# Patient Record
Sex: FEMALE | Race: WHITE | NOT HISPANIC OR LATINO | URBAN - METROPOLITAN AREA
[De-identification: names, ages, dates, MRNs, and addresses within clinical notes are randomized per-mention and may not be internally consistent; named-entity substitution may affect disease eponyms.]

---

## 2019-09-23 ENCOUNTER — OUTPATIENT (OUTPATIENT)
Dept: OUTPATIENT SERVICES | Facility: HOSPITAL | Age: 74
LOS: 1 days | Discharge: HOME | End: 2019-09-23
Payer: MEDICARE

## 2019-09-23 VITALS — HEIGHT: 60 IN | WEIGHT: 177.91 LBS

## 2019-09-23 DIAGNOSIS — Z84.2 FAMILY HISTORY OF OTHER DISEASES OF THE GENITOURINARY SYSTEM: Chronic | ICD-10-CM

## 2019-09-23 PROCEDURE — 93458 L HRT ARTERY/VENTRICLE ANGIO: CPT | Mod: 26

## 2019-09-23 NOTE — H&P CARDIOLOGY - HISTORY OF PRESENT ILLNESS
HPI  74 yrs old female with PMHx of HTN and FHx of CAD presented for [facility] due to abnormal ETT. She is asymptomatic now.  REVIEW OF SYSTEMS:  CONSTITUTIONAL: No weakness, fevers or chills  EYES/ENT: No visual changes;  No vertigo or throat pain   NECK: No pain or stiffness  RESPIRATORY: No cough, wheezing, hemoptysis; SEE HPI  CARDIOVASCULAR: SEE HPI  GASTROINTESTINAL: No abdominal or epigastric pain. No nausea, vomiting, or hematemesis; No diarrhea or constipation. No melena or hematochezia.  GENITOURINARY: No dysuria, frequency or hematuria  NEUROLOGICAL: No numbness or weakness  SKIN: No itching, rashes      PHYSICAL EXAM:  T(C): --  HR: 80  BP: 188/75  RR: 16  SpO2: 97% ra  GENERAL: NAD  HEAD:  Atraumatic, Normocephalic  EYES: conjunctiva and sclera clear  NECK: No JVD  CHEST/LUNG: Clear to auscultation bilaterally; No wheeze  HEART: Regular rate and rhythm; No murmurs  ABDOMEN: Soft, Nontender, Nondistended; Bowel sounds present  EXTREMITIES:  2+ Peripheral Pulses, No clubbing, cyanosis, or edema  NEUROLOGY:  A&Ox3, appropriate  SKIN: No rashes or lesions  ALAINA TEST:right wnl

## 2019-09-23 NOTE — CHART NOTE - NSCHARTNOTEFT_GEN_A_CORE
PRE-OP DIAGNOSIS: abnormal stress test, HTN, DM , DL     PROCEDURE: C with coronary angiography    Physician: Dr Andersen  Assistant: Rafaela Kaur    ANESTHESIA TYPE:  [  ]General Anesthesia  [  ] Sedation  [ x ] Local/Regional    ESTIMATED BLOOD LOSS:    10   mL    CONDITION  [  ] Critical  [  ] Serious  [  ]Fair  [  x]Good      SPECIMENS REMOVED (IF APPLICABLE): N/A      IV CONTRAST:     45        mL      IMPLANTS (IF APPLICABLE)      FINDINGS    Left Heart Catheterization:    LVEDP: mild elevation   [ ] Normal Coronary Arteries  [ ] Luminal Irregularities  [x ] Non-obstructive CAD      LEFT HEART CATHETERIZATION                                    Left main normal short    LAD: moderate atherosclerosis                        Diag: patent     Left Circumflex: mild atherosclersosis  OM: patent    Right Coronary Artery: ectasia present, mild atherosclerosis  RPDA patent    RI : prox 30% lesions       DOMINANCE: Right    ACCESS: right radial  CLOSURE: D stat    INTERVENTION  none     POST-OP DIAGNOSIS  non obstructive disease       PLAN OF CARE  [ x] D/C Home today  [x ]  Continue ASA, B-blocker & Statin therapy PRE-OP DIAGNOSIS: abnormal stress test, HTN, DM , DL     PROCEDURE: [facility] with coronary angiography    Physician: Dr Andersen  Assistant: Rafaela Kaur    ANESTHESIA TYPE:  [  ]General Anesthesia  [  ] Sedation  [ x ] Local/Regional    ESTIMATED BLOOD LOSS:    10   mL    CONDITION  [  ] Critical  [  ] Serious  [  ]Fair  [  x]Good      SPECIMENS REMOVED (IF APPLICABLE): N/A      IV CONTRAST:     45        mL      IMPLANTS (IF APPLICABLE)      FINDINGS    Left Heart Catheterization:    LVEDP: mild elevation   [ ] Normal Coronary Arteries  [ ] Luminal Irregularities  [x ] Non-obstructive CAD      LEFT HEART CATHETERIZATION                                    Left main normal short    LAD: moderate atherosclerosis                        Diag: patent     Left Circumflex: mild atherosclerosis  OM: patent    Right Coronary Artery: ectasia present, mild atherosclerosis  RPDA patent    RI : prox 30% lesions       DOMINANCE: Right    ACCESS: right radial  CLOSURE: D stat    INTERVENTION  none     POST-OP DIAGNOSIS  non obstructive disease       PLAN OF CARE  [ x] D/C Home today  [x ]  Continue ASA, B-blocker & Statin therapy

## 2019-09-30 DIAGNOSIS — I25.118 ATHEROSCLEROTIC HEART DISEASE OF NATIVE CORONARY ARTERY WITH OTHER FORMS OF ANGINA PECTORIS: ICD-10-CM

## 2019-09-30 DIAGNOSIS — Z82.49 FAMILY HISTORY OF ISCHEMIC HEART DISEASE AND OTHER DISEASES OF THE CIRCULATORY SYSTEM: ICD-10-CM

## 2019-09-30 DIAGNOSIS — I20.8 OTHER FORMS OF ANGINA PECTORIS: ICD-10-CM

## 2019-09-30 DIAGNOSIS — Z79.82 LONG TERM (CURRENT) USE OF ASPIRIN: ICD-10-CM

## 2019-09-30 DIAGNOSIS — I10 ESSENTIAL (PRIMARY) HYPERTENSION: ICD-10-CM

## 2020-10-08 PROBLEM — Z86.79 PERSONAL HISTORY OF OTHER DISEASES OF THE CIRCULATORY SYSTEM: Chronic | Status: ACTIVE | Noted: 2019-09-23

## 2020-10-28 ENCOUNTER — OUTPATIENT (OUTPATIENT)
Dept: OUTPATIENT SERVICES | Facility: HOSPITAL | Age: 75
LOS: 1 days | Discharge: HOME | End: 2020-10-28

## 2020-10-28 VITALS — HEART RATE: 64 BPM | SYSTOLIC BLOOD PRESSURE: 148 MMHG | DIASTOLIC BLOOD PRESSURE: 69 MMHG

## 2020-10-28 VITALS
TEMPERATURE: 98 F | WEIGHT: 177.03 LBS | HEART RATE: 64 BPM | DIASTOLIC BLOOD PRESSURE: 69 MMHG | OXYGEN SATURATION: 97 % | RESPIRATION RATE: 17 BRPM | SYSTOLIC BLOOD PRESSURE: 147 MMHG

## 2020-10-28 DIAGNOSIS — Z84.2 FAMILY HISTORY OF OTHER DISEASES OF THE GENITOURINARY SYSTEM: Chronic | ICD-10-CM

## 2020-10-28 RX ORDER — ACETAMINOPHEN 500 MG
650 TABLET ORAL ONCE
Refills: 0 | Status: DISCONTINUED | OUTPATIENT
Start: 2020-10-28 | End: 2020-11-11

## 2020-10-28 NOTE — CHART NOTE - NSCHARTNOTEFT_GEN_A_CORE
PACU ANESTHESIA ADMISSION NOTE      Procedure: Right Eye Cataract Extraction with IOL  Post op diagnosis:  Right Eye Cataract    ____  Intubated  TV:______       Rate: ______      FiO2: ______    __x__  Patent Airway    __x__  Full return of protective reflexes    __x__  Full recovery from anesthesia / back to baseline     Vitals:   T:   37        R:      16            BP:       130/66           Sat:    99               P: 66      Mental Status:  __x__ Awake   _____ Alert   _____ Drowsy   _____ Sedated    Nausea/Vomiting:  __x__ NO  ______Yes,   See Post - Op Orders          Pain Scale (0-10):  __0___    Treatment: ____ None    ___x_ See Post - Op/PCA Orders    Post - Operative Fluids:   ____ Oral   ___x_ See Post - Op Orders    Plan: Discharge:   ___x_Home       _____Floor     _____Critical Care    _____  Other:_________________    Comments:

## 2020-10-28 NOTE — PRE-ANESTHESIA EVALUATION ADULT - NSANTHOSAYNRD_GEN_A_CORE
No. JUDY screening performed.  STOP BANG Legend: 0-2 = LOW Risk; 3-4 = INTERMEDIATE Risk; 5-8 = HIGH Risk/denies

## 2020-10-30 PROBLEM — E66.9 OBESITY, UNSPECIFIED: Chronic | Status: ACTIVE | Noted: 2020-10-28

## 2020-10-30 PROBLEM — H26.9 UNSPECIFIED CATARACT: Chronic | Status: ACTIVE | Noted: 2020-10-28

## 2020-10-31 DIAGNOSIS — I10 ESSENTIAL (PRIMARY) HYPERTENSION: ICD-10-CM

## 2020-10-31 DIAGNOSIS — Z79.82 LONG TERM (CURRENT) USE OF ASPIRIN: ICD-10-CM

## 2020-10-31 DIAGNOSIS — H25.11 AGE-RELATED NUCLEAR CATARACT, RIGHT EYE: ICD-10-CM

## 2020-11-03 NOTE — ASU PATIENT PROFILE, ADULT - PSH
FH: SADI-BSO (total abdominal hysterectomy and bilateral salpingo-oophorectomy)  right eye cataract surgery 0ctober 2020, left eye november 2020

## 2020-11-04 ENCOUNTER — OUTPATIENT (OUTPATIENT)
Dept: OUTPATIENT SERVICES | Facility: HOSPITAL | Age: 75
LOS: 1 days | Discharge: HOME | End: 2020-11-04

## 2020-11-04 VITALS
HEIGHT: 60 IN | DIASTOLIC BLOOD PRESSURE: 68 MMHG | RESPIRATION RATE: 16 BRPM | SYSTOLIC BLOOD PRESSURE: 145 MMHG | WEIGHT: 177.03 LBS | OXYGEN SATURATION: 96 % | TEMPERATURE: 97 F | HEART RATE: 62 BPM

## 2020-11-04 VITALS
OXYGEN SATURATION: 97 % | HEART RATE: 68 BPM | RESPIRATION RATE: 16 BRPM | SYSTOLIC BLOOD PRESSURE: 148 MMHG | DIASTOLIC BLOOD PRESSURE: 70 MMHG

## 2020-11-04 DIAGNOSIS — Z84.2 FAMILY HISTORY OF OTHER DISEASES OF THE GENITOURINARY SYSTEM: Chronic | ICD-10-CM

## 2020-11-04 RX ORDER — ASPIRIN/CALCIUM CARB/MAGNESIUM 324 MG
1 TABLET ORAL
Qty: 0 | Refills: 0 | DISCHARGE

## 2020-11-04 RX ORDER — LOSARTAN/HYDROCHLOROTHIAZIDE 100MG-25MG
1 TABLET ORAL
Qty: 0 | Refills: 0 | DISCHARGE

## 2020-11-04 RX ORDER — METOPROLOL TARTRATE 50 MG
1 TABLET ORAL
Qty: 0 | Refills: 0 | DISCHARGE

## 2020-11-04 RX ORDER — ATORVASTATIN CALCIUM 80 MG/1
0 TABLET, FILM COATED ORAL
Qty: 0 | Refills: 0 | DISCHARGE

## 2020-11-04 NOTE — PRE-ANESTHESIA EVALUATION ADULT - NSANTHOSAYNRD_GEN_A_CORE
denies/No. JUDY screening performed.  STOP BANG Legend: 0-2 = LOW Risk; 3-4 = INTERMEDIATE Risk; 5-8 = HIGH Risk

## 2020-11-04 NOTE — ASU PREOP CHECKLIST - SURGICAL CONSENT
Problem: CARDIOVASCULAR - ADULT  Goal: Absence of cardiac arrhythmias or at baseline  INTERVENTIONS:  - Continuous cardiac monitoring, monitor vital signs, obtain 12 lead EKG if indicated  - Evaluate effectiveness of antiarrhythmic and heart rate control m done

## 2020-11-08 DIAGNOSIS — Z90.710 ACQUIRED ABSENCE OF BOTH CERVIX AND UTERUS: ICD-10-CM

## 2020-11-08 DIAGNOSIS — H26.9 UNSPECIFIED CATARACT: ICD-10-CM

## 2020-11-08 DIAGNOSIS — E66.9 OBESITY, UNSPECIFIED: ICD-10-CM

## 2020-11-08 DIAGNOSIS — Z90.722 ACQUIRED ABSENCE OF OVARIES, BILATERAL: ICD-10-CM

## 2020-11-08 DIAGNOSIS — I10 ESSENTIAL (PRIMARY) HYPERTENSION: ICD-10-CM

## 2020-11-08 DIAGNOSIS — Z90.79 ACQUIRED ABSENCE OF OTHER GENITAL ORGAN(S): ICD-10-CM

## 2021-09-27 ENCOUNTER — TRANSCRIPTION ENCOUNTER (OUTPATIENT)
Age: 76
End: 2021-09-27

## 2023-01-23 ENCOUNTER — APPOINTMENT (OUTPATIENT)
Dept: ORTHOPEDIC SURGERY | Facility: CLINIC | Age: 78
End: 2023-01-23
Payer: MEDICARE

## 2023-01-23 VITALS — BODY MASS INDEX: 34.36 KG/M2 | HEIGHT: 60 IN | WEIGHT: 175 LBS

## 2023-01-23 DIAGNOSIS — M17.10 UNILATERAL PRIMARY OSTEOARTHRITIS, UNSPECIFIED KNEE: ICD-10-CM

## 2023-01-23 PROBLEM — Z00.00 ENCOUNTER FOR PREVENTIVE HEALTH EXAMINATION: Status: ACTIVE | Noted: 2023-01-23

## 2023-01-23 PROCEDURE — 99204 OFFICE O/P NEW MOD 45 MIN: CPT | Mod: 25

## 2023-01-23 PROCEDURE — 20611 DRAIN/INJ JOINT/BURSA W/US: CPT | Mod: RT

## 2023-01-23 PROCEDURE — 73560 X-RAY EXAM OF KNEE 1 OR 2: CPT | Mod: 50

## 2023-01-23 NOTE — HISTORY OF PRESENT ILLNESS
[de-identified] : 77 year old female presents with one year of RT knee pain when using stairs. She is a retired  for a school. Intermittent pain at night when going to bed. Pt used OTC Advil and Voltaren gel which provided mild relief. She points anterior and medial aspects of the knee as the location of pain. Denies pain, buckling, or instability when walking.\par \par Pt has h/o hypertension and takes metoprolol and valsartan. patient  is 5 ft, weight is 175\par \par X-ray done in office today, reviewed and analyzed. Shows severe arthritis Medial compartment, moderate to severe patellofemoral. No fracture or soft tissue calcifications are seen.\par \par On exam guarded range of motion pain medially and some patellofemoral crepitus negative Homans sign no erythema\par \par Recommending a cortisone injection at this time along with physical therapy with a home program. She will follow up with Terrance in 6 weeks. If she is happy with the results of the cortisone injection she will continue it as needed, if not we will move to gel injections.She also was interested in a possible arthroscopy and understands the risks and benefits of that with variable success with chondromalacia and also mention that she would be candidate for knee replacement if she fails these other treatment plans\par \par Procedure Name: Large Joint Injection / Aspiration: Dexamethasone, Lidocaine Ultrasound and Guidance.\par Large Joint Injection was performed because of pain. Anesthesia: ethyl chloride sprayed topically... Dexamethasone 1 cc. Need;e size: 22 gauge. 1.5 inch.\par Lidocaine: 2 cc. Needle size: 22 gauge, 1.5 inch.\par \par Medication was injection in the joint. After verbal consent using sterile preparation and technique. The risks, benefits, and alternatives to cortisone injection were explained in full to the patient. Risks outlined include but are not limited to infection, sepsis, bleeding, scarring, skin discoloration, temporary increase in pain, syncopal episode, failure to resolve symptoms, allergic reaction, symptom recurrence, and elevation of blood sugar in diabetics. Patient understood the risks. All questions were answered. After discussion of options, patient requested an injection. Oral informed consent was obtained and sterile prep was done of the injection sire. Sterile technique was utilized for the procedure including the preparation of the solutions used for the injection. Patient tolerated the procedure well. Advised to ice the injection sire this evening. Prep with alcohol locally to the site. Sterile technique used. \par Post procedure instructions:\par Ultrasound guidance was used for the following reasons: to visualize the needle in the joint.\par Visualization of the needle and placement of injection was performed without complication.

## 2023-01-30 ENCOUNTER — APPOINTMENT (OUTPATIENT)
Dept: OBGYN | Facility: CLINIC | Age: 78
End: 2023-01-30
Payer: MEDICARE

## 2023-01-30 VITALS — BODY MASS INDEX: 34.36 KG/M2 | WEIGHT: 175 LBS | HEIGHT: 60 IN | TEMPERATURE: 97.7 F

## 2023-01-30 VITALS — DIASTOLIC BLOOD PRESSURE: 78 MMHG | SYSTOLIC BLOOD PRESSURE: 136 MMHG

## 2023-01-30 DIAGNOSIS — Z78.9 OTHER SPECIFIED HEALTH STATUS: ICD-10-CM

## 2023-01-30 DIAGNOSIS — Z86.39 PERSONAL HISTORY OF OTHER ENDOCRINE, NUTRITIONAL AND METABOLIC DISEASE: ICD-10-CM

## 2023-01-30 DIAGNOSIS — Z85.43 PERSONAL HISTORY OF MALIGNANT NEOPLASM OF OVARY: ICD-10-CM

## 2023-01-30 DIAGNOSIS — Z86.79 PERSONAL HISTORY OF OTHER DISEASES OF THE CIRCULATORY SYSTEM: ICD-10-CM

## 2023-01-30 LAB
BILIRUB UR QL STRIP: NORMAL
CLARITY UR: CLEAR
GLUCOSE UR-MCNC: NORMAL
HCG UR QL: NORMAL EU/DL
HGB UR QL STRIP.AUTO: NORMAL
KETONES UR-MCNC: NORMAL
LEUKOCYTE ESTERASE UR QL STRIP: NORMAL
NITRITE UR QL STRIP: NORMAL
PH UR STRIP: 5.5
PROT UR STRIP-MCNC: NORMAL
SP GR UR STRIP: 1.01

## 2023-01-30 PROCEDURE — 81003 URINALYSIS AUTO W/O SCOPE: CPT | Mod: QW

## 2023-01-30 PROCEDURE — 99203 OFFICE O/P NEW LOW 30 MIN: CPT

## 2023-01-30 RX ORDER — VALSARTAN 40 MG/1
TABLET, COATED ORAL
Refills: 0 | Status: ACTIVE | COMMUNITY

## 2023-01-30 RX ORDER — METOPROLOL TARTRATE 75 MG/1
TABLET, FILM COATED ORAL
Refills: 0 | Status: ACTIVE | COMMUNITY

## 2023-01-30 RX ORDER — ATORVASTATIN CALCIUM 80 MG/1
TABLET, FILM COATED ORAL
Refills: 0 | Status: ACTIVE | COMMUNITY

## 2023-02-27 ENCOUNTER — APPOINTMENT (OUTPATIENT)
Dept: ORTHOPEDIC SURGERY | Facility: CLINIC | Age: 78
End: 2023-02-27
Payer: MEDICARE

## 2023-02-27 PROCEDURE — 99213 OFFICE O/P EST LOW 20 MIN: CPT

## 2023-02-27 NOTE — HISTORY OF PRESENT ILLNESS
[de-identified] : The patient is a 77-year-old female here for a subsequent reevaluation of her right knee.  She is still having pain in the right knee since her previous office visit on 1/23/2023.  She has been doing home therapy exercises for the knee.  The cortisone injection on her previous visit here which provided her with 2 days of relief, her pain then returned.  She has difficulty ambulating down the stairs.  She has tried all anti-inflammatory medication with no relief.

## 2023-02-27 NOTE — DISCUSSION/SUMMARY
[de-identified] : At this point I recommend viscous injections for the right knee.  She has tried oral anti-inflammatory medication and cortisone and obtained no relief.  She has been doing home therapy exercises still has persistent pain in the right knee.  Send authorization for Euflexxa series injection for the right knee.  Once the medication arrives, we will call the patient to schedule the appointments to administer the injections.\par \par Supervising Physician: Dr. George

## 2023-02-27 NOTE — PHYSICAL EXAM
[Right] : right knee [NL (0)] : extension 0 degrees [] : patient ambulates without assistive device [TWNoteComboBox7] : flexion 120 degrees

## 2023-03-07 ENCOUNTER — APPOINTMENT (OUTPATIENT)
Dept: ORTHOPEDIC SURGERY | Facility: CLINIC | Age: 78
End: 2023-03-07

## 2023-03-13 ENCOUNTER — APPOINTMENT (OUTPATIENT)
Dept: ORTHOPEDIC SURGERY | Facility: CLINIC | Age: 78
End: 2023-03-13
Payer: MEDICARE

## 2023-03-13 PROCEDURE — 20611 DRAIN/INJ JOINT/BURSA W/US: CPT | Mod: RT

## 2023-03-13 PROCEDURE — 99213 OFFICE O/P EST LOW 20 MIN: CPT | Mod: 25

## 2023-03-13 PROCEDURE — 99212 OFFICE O/P EST SF 10 MIN: CPT | Mod: 25

## 2023-03-13 NOTE — PHYSICAL EXAM
[Right] : right knee [NL (0)] : extension 0 degrees [] : non-antalgic [FreeTextEntry9] : Range of motion assessed with the patient sitting up on the exam table [TWNoteComboBox7] : flexion 90 degrees

## 2023-03-13 NOTE — PROCEDURE
[Large Joint Injection] : Large joint injection [Right] : of the right [Knee] : knee [Synvisc (16mg)] : 16mg of Synvisc [#1] : series #1 [Risks, benefits, alternatives discussed / Verbal consent obtained] : the risks benefits, and alternatives have been discussed, and verbal consent was obtained [All ultrasound images have been permanently captured and stored accordingly in our picture archiving and communication system] : All ultrasound images have been permanently captured and stored accordingly in our picture archiving and communication system [Visualization of the needle and placement of injection was performed without complication] : visualization of the needle and placement of injection was performed without complication

## 2023-03-13 NOTE — HISTORY OF PRESENT ILLNESS
[de-identified] : The patient is a 77-year-old female here for a subsequent reevaluation of right knee osteoarthritis.

## 2023-03-13 NOTE — DISCUSSION/SUMMARY
[de-identified] : Today I recommend a Synvisc injection for the right knee.  The patient agreed.  The right knee was injected with 2 cc of Synvisc, procedure note generated.  This was the first injection of Synvisc for the right knee.  I will see her in a week for further evaluation.\par \par Supervising Physician: Dr. George

## 2023-03-20 ENCOUNTER — APPOINTMENT (OUTPATIENT)
Dept: ORTHOPEDIC SURGERY | Facility: CLINIC | Age: 78
End: 2023-03-20
Payer: MEDICARE

## 2023-03-20 PROCEDURE — 20611 DRAIN/INJ JOINT/BURSA W/US: CPT | Mod: 50

## 2023-03-20 NOTE — DISCUSSION/SUMMARY
[de-identified] : Today I recommend a Synvisc injection for the right knee.  The right knee was injected with 2 cc of Synvisc, procedure note generated.  This was the second injection of Synvisc for the right knee.  I will see her back in a week for further evaluation.\par \par Supervising Physician: Dr. George

## 2023-03-20 NOTE — HISTORY OF PRESENT ILLNESS
[de-identified] : The patient is a 77-year-old female here for a subsequent reevaluation of right knee osteoarthritis.  She states felt relief after the injections last week but that wore off a day or 2 ago.

## 2023-03-20 NOTE — PHYSICAL EXAM
[Right] : right knee [NL (0)] : extension 0 degrees [] : patient ambulates without assistive device [FreeTextEntry9] : Range of motion assessed with the patient sitting up on the exam table [TWNoteComboBox7] : flexion 90 degrees

## 2023-03-20 NOTE — PROCEDURE
[Large Joint Injection] : Large joint injection [Right] : of the right [Knee] : knee [Synvisc (16mg)] : 16mg of Synvisc [#2] : series #2 [Risks, benefits, alternatives discussed / Verbal consent obtained] : the risks benefits, and alternatives have been discussed, and verbal consent was obtained [All ultrasound images have been permanently captured and stored accordingly in our picture archiving and communication system] : All ultrasound images have been permanently captured and stored accordingly in our picture archiving and communication system [Visualization of the needle and placement of injection was performed without complication] : visualization of the needle and placement of injection was performed without complication

## 2023-03-27 ENCOUNTER — APPOINTMENT (OUTPATIENT)
Dept: ORTHOPEDIC SURGERY | Facility: CLINIC | Age: 78
End: 2023-03-27

## 2023-04-03 ENCOUNTER — APPOINTMENT (OUTPATIENT)
Dept: ORTHOPEDIC SURGERY | Facility: CLINIC | Age: 78
End: 2023-04-03
Payer: MEDICARE

## 2023-04-03 PROCEDURE — 20611 DRAIN/INJ JOINT/BURSA W/US: CPT | Mod: RT

## 2023-04-03 NOTE — DISCUSSION/SUMMARY
[de-identified] : Today I recommend a Synvisc injection for the right knee.  The right knee was injected with 2 cc of Synvisc, procedure note generated.  This was the third injection of Synvisc for the right knee.  I will see her back in 6 months for further evaluation.\par \par Supervising Physician: Dr. George

## 2023-04-03 NOTE — PROCEDURE
[Large Joint Injection] : Large joint injection [Right] : of the right [Knee] : knee [Synvisc (16mg)] : 16mg of Synvisc [#3] : series #3 [Risks, benefits, alternatives discussed / Verbal consent obtained] : the risks benefits, and alternatives have been discussed, and verbal consent was obtained [All ultrasound images have been permanently captured and stored accordingly in our picture archiving and communication system] : All ultrasound images have been permanently captured and stored accordingly in our picture archiving and communication system [Visualization of the needle and placement of injection was performed without complication] : visualization of the needle and placement of injection was performed without complication

## 2023-04-03 NOTE — HISTORY OF PRESENT ILLNESS
[de-identified] : The patient is a 78-year-old female here for a subsequent reevaluation of right knee osteoarthritis.  She had her second Synvisc injection the right knee one week ago.

## 2023-12-18 ENCOUNTER — APPOINTMENT (OUTPATIENT)
Dept: ORTHOPEDIC SURGERY | Facility: CLINIC | Age: 78
End: 2023-12-18
Payer: MEDICARE

## 2023-12-18 VITALS — HEIGHT: 60 IN | WEIGHT: 174 LBS | BODY MASS INDEX: 34.16 KG/M2

## 2023-12-18 DIAGNOSIS — M25.561 PAIN IN RIGHT KNEE: ICD-10-CM

## 2023-12-18 PROCEDURE — 99213 OFFICE O/P EST LOW 20 MIN: CPT

## 2023-12-18 NOTE — HISTORY OF PRESENT ILLNESS
[de-identified] : The patient is a 78-year-old female here for a subsequent reevaluation of right knee osteoarthritis.  She is status post Synvisc series injections for the right knee, the last injection 4/3/2023.  She did not obtain any relief from the injections.  She has pain going up and down the stairs, she points medially as to where the pain is.

## 2023-12-18 NOTE — DISCUSSION/SUMMARY
[de-identified] : We discussed the treatment plan.  She was inquiring about surgery to remove arthritis.  I explained to her that we cannot remove the arthritis however sometimes we do smooth arthritis down when we are in the OR for a meniscus procedure.  Since she has done home therapy exercises, had a cortisone injection with no relief and viscous injections with no relief but still has some preservation of the medial compartment, I recommend an MRI of the right knee to evaluate for medial meniscus tear.  In the meantime she will start formal therapy, Rx provided for that.  She will call me 2 to 3 days after the MRI is performed so we can discuss results, at that point we will make a follow-up and treatment plan.

## 2023-12-18 NOTE — IMAGING
[de-identified] : Physical exam of the right knee: No effusion, no erythema, no ecchymosis.  Full extension, flexion to 120 degrees.  Tenderness to palpation over the medial joint line.  No lateral joint line tenderness to palpation.  No tenderness to palpation over the collateral ligaments.  No tenderness to palpation over the anterior aspect of the knee.  Positive Sebastien's medially.  Intact to light touch, nonantalgic gait.

## 2024-02-07 ENCOUNTER — APPOINTMENT (OUTPATIENT)
Dept: ORTHOPEDIC SURGERY | Facility: CLINIC | Age: 79
End: 2024-02-07
Payer: MEDICARE

## 2024-02-07 DIAGNOSIS — M17.11 UNILATERAL PRIMARY OSTEOARTHRITIS, RIGHT KNEE: ICD-10-CM

## 2024-02-07 DIAGNOSIS — S83.231D COMPLEX TEAR OF MEDIAL MENISCUS, CURRENT INJURY, RIGHT KNEE, SUBSEQUENT ENCOUNTER: ICD-10-CM

## 2024-02-07 PROCEDURE — 99214 OFFICE O/P EST MOD 30 MIN: CPT

## 2024-02-07 NOTE — HISTORY OF PRESENT ILLNESS
[de-identified] : cc rt knee.   78-year-old female presents rt knee. She is a retired . She had rounds of Synvisc series and states that it did not give her relief. She points medially to where the pain is. She has done home therapy and cortisone injection as well. She states that she has trouble going downstairs.  Pain anterior and medially  MRI of the rt knee shows a complex tearing at the posterior horn and root insertion of the medial meniscus.  And chondromalacia  Denies heart attack, diabetes h/o blood pressure   on exam right knee good range of motion some patellofemoral crepitus tender medially positive Sebastien's  Discussed in detail both arthroscopy and total knee replacement she is not sure if she wants surgery if she does she will call us. She will call with her decision.  Surgical Discussion (general)  The patient was advised of the diagnosis. The natural history of the pathology was explained in full to the patient in layman's terms. All questions were answered. The risks and benefits of surgical and non-surgical treatment alternatives were explained in full to the patient.   The patient demonstrated a full understanding of the surgical and non-surgical options. The risks of surgery were outlined in full to the patient including but not limited to bleeding, scarring, infection, sepsis, neurologic injury, vascular injury, failure to resolve symptoms, symptom recurrence, the need for further-surgery, non-healing, wound breakdown, deep vein thrombosis, pulmonary embolism, spontaneous osteonecrosis, anesthesia complications and even death. The patient understood all the risks and accepted them and understood that other complications could occur that are not mentioned above. The intraoperative plan, post-operative plan, post-operative expectations and limitations were explained in full. Expectations from non-surgical treatment were explained in full as well. The patient demonstrated a complete understanding of the treatment alternatives and requested the above-mentioned procedure. This will be scheduled accordingly.

## 2024-06-07 ENCOUNTER — APPOINTMENT (OUTPATIENT)
Dept: OBGYN | Facility: CLINIC | Age: 79
End: 2024-06-07
Payer: MEDICARE

## 2024-06-07 VITALS — HEIGHT: 61 IN | BODY MASS INDEX: 32.85 KG/M2 | WEIGHT: 174 LBS

## 2024-06-07 VITALS — SYSTOLIC BLOOD PRESSURE: 128 MMHG | DIASTOLIC BLOOD PRESSURE: 60 MMHG

## 2024-06-07 DIAGNOSIS — N39.46 MIXED INCONTINENCE: ICD-10-CM

## 2024-06-07 DIAGNOSIS — Z78.0 ASYMPTOMATIC MENOPAUSAL STATE: ICD-10-CM

## 2024-06-07 DIAGNOSIS — L30.4 ERYTHEMA INTERTRIGO: ICD-10-CM

## 2024-06-07 DIAGNOSIS — N95.2 POSTMENOPAUSAL ATROPHIC VAGINITIS: ICD-10-CM

## 2024-06-07 LAB
BILIRUB UR QL STRIP: NORMAL
CLARITY UR: CLEAR
COLLECTION METHOD: NORMAL
GLUCOSE UR-MCNC: NORMAL
HCG UR QL: 0.2 EU/DL
HGB UR QL STRIP.AUTO: NORMAL
KETONES UR-MCNC: NORMAL
LEUKOCYTE ESTERASE UR QL STRIP: NORMAL
NITRITE UR QL STRIP: NORMAL
PH UR STRIP: 5.5
PROT UR STRIP-MCNC: NORMAL
SP GR UR STRIP: 1.3

## 2024-06-07 PROCEDURE — 99213 OFFICE O/P EST LOW 20 MIN: CPT | Mod: 25

## 2024-06-07 PROCEDURE — 81003 URINALYSIS AUTO W/O SCOPE: CPT | Mod: QW

## 2024-06-07 RX ORDER — KETOCONAZOLE 20 MG/G
2 CREAM TOPICAL
Qty: 1 | Refills: 3 | Status: ACTIVE | COMMUNITY
Start: 2023-01-30 | End: 1900-01-01

## 2024-06-07 NOTE — HISTORY OF PRESENT ILLNESS
[FreeTextEntry1] : 78 yo  postmenopausal presents for annual visit and followup for menopause. She denies breast pain, breast changes, abdominal pain, pelvic pain, postmenopausal bleeding, vaginal discharge, dyspareunia. She reports leakage of urine with coughing sneezing laughing that has become bothersome to her.   OB:  vaginal delivery x2 GYN: Denies h/o abnormal pap smears or STIs. H/o fibroids. H/o ovarian cancer s/p TLH BSO  with adjuvant chemo, no recurrence. LMP prior to hysterectomy, unsure when. Follows with PCP for breast cancer screening Follows with PCP for bone density screening PMH: Hypertension, hyperlipidemia PSH: TLH BSO, diagnostic angiography without any intervention Meds: valsartan, metoprolol, atorvastatin

## 2024-06-07 NOTE — DISCUSSION/SUMMARY
[FreeTextEntry1] : 78 yo  postmenopausal with mixed incontinence, intertrigo, and vulvar atrophy - Discussed menopause - Discussed intertrigo, sent ketoconazole, she had improvement from it last year, has returned since her incontinence has increased and she uses disposable underwear. - Discussed diagnosis of mixed incontinence, including the components of stress incontinence and urge incontinence. Discussed that treatment for each can include medical management or surgical management, as well as pelvic floor physical therapy. Referral for urogynecology Dr. Reese/Dr. Echeverria given for further evaluation and discussion. - Continue to follow with PCP for breast and colon cancer screenings

## 2024-06-07 NOTE — PHYSICAL EXAM
[Appropriately responsive] : appropriately responsive [Alert] : alert [No Acute Distress] : no acute distress [Soft] : soft [Non-tender] : non-tender [Non-distended] : non-distended [No Lesions] : no lesions [No Mass] : no mass [Oriented x3] : oriented x3 [Examination Of The Breasts] : a normal appearance [No Masses] : no breast masses were palpable [Vulvar Atrophy] : vulvar atrophy [Labia Majora] : normal [Labia Minora] : normal [Normal] : normal [Atrophy] : atrophy [No Bleeding] : There was no active vaginal bleeding [Absent] : absent [Uterine Adnexae] : absent [FreeTextEntry1] : Scally rash on external vulva consistent with intertrigo
